# Patient Record
Sex: MALE | Race: NATIVE HAWAIIAN OR OTHER PACIFIC ISLANDER
[De-identification: names, ages, dates, MRNs, and addresses within clinical notes are randomized per-mention and may not be internally consistent; named-entity substitution may affect disease eponyms.]

---

## 2020-09-10 ENCOUNTER — HOSPITAL ENCOUNTER (EMERGENCY)
Dept: HOSPITAL 47 - EC | Age: 30
Discharge: HOME | End: 2020-09-10
Payer: COMMERCIAL

## 2020-09-10 VITALS — SYSTOLIC BLOOD PRESSURE: 124 MMHG | HEART RATE: 66 BPM | DIASTOLIC BLOOD PRESSURE: 90 MMHG

## 2020-09-10 VITALS — TEMPERATURE: 98.1 F | RESPIRATION RATE: 18 BRPM

## 2020-09-10 DIAGNOSIS — S46.911A: Primary | ICD-10-CM

## 2020-09-10 DIAGNOSIS — F17.200: ICD-10-CM
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
## 2020-09-10 DIAGNOSIS — S29.012A: ICD-10-CM

## 2020-09-10 DIAGNOSIS — S20.211A: ICD-10-CM
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
## 2020-09-10 PROCEDURE — 72072 X-RAY EXAM THORAC SPINE 3VWS: CPT

## 2020-09-10 PROCEDURE — 99284 EMERGENCY DEPT VISIT MOD MDM: CPT

## 2020-09-10 NOTE — XR
EXAMINATION TYPE: XR ribs RT w pa chest xray

 

DATE OF EXAM: 9/10/2020

 

COMPARISON: None

 

HISTORY: Rib pain

 

TECHNIQUE: 5 views

 

FINDINGS: Heart and mediastinum are normal. Lungs are clear. Diaphragm is normal. There are no hilar 
masses.

 

I see no pleural effusion or pneumothorax. The right ribs appear intact.

 

IMPRESSION: Normal chest. Normal right ribs.
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
